# Patient Record
Sex: MALE | Race: WHITE
[De-identification: names, ages, dates, MRNs, and addresses within clinical notes are randomized per-mention and may not be internally consistent; named-entity substitution may affect disease eponyms.]

---

## 2017-05-30 ENCOUNTER — HOSPITAL (OUTPATIENT)
Dept: OTHER | Age: 52
End: 2017-05-30

## 2017-11-11 ENCOUNTER — HOSPITAL (OUTPATIENT)
Dept: OTHER | Age: 52
End: 2017-11-11
Attending: INTERNAL MEDICINE

## 2017-11-11 LAB
A/G RATIO_: 1.1
ABS LYMPH: 1.8 K/CUMM (ref 1–3.5)
ABS MONO: 0.5 K/CUMM (ref 0.1–0.8)
ABS NEUTRO: 3.6 K/CUMM (ref 2–8)
ALBUMIN: 3.9 G/DL (ref 3.5–5)
ALK PHOS: 61 UNIT/L (ref 50–124)
ALT/GPT: 33 UNIT/L (ref 0–55)
ANION GAP SERPL CALC-SCNC: 10 MEQ/L (ref 10–20)
AST/GOT: 18 UNIT/L (ref 5–34)
BASOPHIL: 0 % (ref 0–1)
BILI TOTAL: 0.9 MG/DL (ref 0.2–1)
BUN SERPL-MCNC: 20 MG/DL (ref 6–20)
CALCIUM: 9.6 MG/DL (ref 8.4–10.2)
CHLORIDE: 104 MEQ/L (ref 97–107)
CHOLESTEROL: 209 MG/DL
CREATININE: 1.27 MG/DL (ref 0.6–1.3)
DIFF_TYPE?: NORMAL
EOSINOPHIL: 3 % (ref 0–6)
GLOBULIN_: 3.7 G/DL (ref 2–4.1)
GLUCOSE LVL: 102 MG/DL (ref 70–99)
HCT VFR BLD CALC: 43 % (ref 36–51)
HDLC SERPL-MCNC: 55 MG/DL (ref 40–60)
HEMOLYSIS 2+: NEGATIVE
HEMOLYSIS 4+: NEGATIVE
HGB BLD-MCNC: 13.9 G/DL (ref 12–17)
IMMATURE GRAN: 0.2 % (ref 0–0.3)
INSTR WBC: 6 K/CUMM (ref 4–11)
LDLC SERPL CALC-MCNC: 130 MG/DL
LIPEMIC 3+: NEGATIVE
LYMPHOCYTE: 29 %
MCH RBC QN AUTO: 31 PG (ref 25–35)
MCHC RBC AUTO-ENTMCNC: 32 G/DL (ref 32–37)
MCV RBC AUTO: 96 FL (ref 78–97)
MICROALB/CREAT RATIO: 8 UG/MG
MONOCYTE: 8 %
NEUTROPHIL: 59 %
NRBC BLD MANUAL-RTO: 0 % (ref 0–0.2)
PLATELET: 220 K/CUMM (ref 150–450)
POTASSIUM: 4.4 MEQ/L (ref 3.5–5.1)
PSA LEVEL: 0.47 NG/ML (ref 0–4)
RBC # BLD: 4.45 M/CUMM (ref 4.2–6)
RDW: 12.9 % (ref 11.5–14.5)
SODIUM: 139 MEQ/L (ref 136–145)
TCO2: 29 MEQ/L (ref 19–29)
TOTAL PROTEIN: 7.6 G/DL (ref 6.4–8.3)
TRIGL SERPL-MCNC: 122 MG/DL
TSH SERPL-ACNC: 1.25 MIU/ML (ref 0.4–5)
U CREATININE: 103.79 MG/DL
U MICROALBUMIN: 8 MG/L
UA APPEAR: CLEAR
UA BILI: NEGATIVE
UA BLOOD: NEGATIVE
UA COLOR: YELLOW
UA GLUCOSE: NEGATIVE
UA KETONES: NEGATIVE
UA LEUK EST: NEGATIVE
UA NITRITE: NEGATIVE
UA PH: 6 (ref 5–7)
UA PROTEIN: NEGATIVE
UA SPEC GRAV: 1.01 (ref 1.01–1.02)
UA UROBILINOGEN: 0.2 MG/DL (ref 0.2–1)
WBC # BLD: 6 K/CUMM (ref 4–11)

## 2017-11-20 ENCOUNTER — HOSPITAL (OUTPATIENT)
Dept: OTHER | Age: 52
End: 2017-11-20
Attending: FAMILY MEDICINE

## 2018-02-07 ENCOUNTER — HOSPITAL (OUTPATIENT)
Dept: OTHER | Age: 53
End: 2018-02-07
Attending: FAMILY MEDICINE

## 2018-04-08 ENCOUNTER — HOSPITAL (OUTPATIENT)
Dept: OTHER | Age: 53
End: 2018-04-08
Attending: NURSE PRACTITIONER

## 2018-05-15 ENCOUNTER — HOSPITAL (OUTPATIENT)
Dept: OTHER | Age: 53
End: 2018-05-15

## 2018-05-15 LAB
A/G RATIO_: 1.3
ABS LYMPH: 2.3 K/CUMM (ref 1–3.5)
ABS MONO: 0.6 K/CUMM (ref 0.1–0.8)
ABS NEUTRO: 2.6 K/CUMM (ref 2–8)
ALBUMIN: 4 G/DL (ref 3.5–5)
ALK PHOS: 65 UNIT/L (ref 50–124)
ALT/GPT: 32 UNIT/L (ref 0–55)
ANION GAP SERPL CALC-SCNC: 13 MEQ/L (ref 10–20)
AST/GOT: 16 UNIT/L (ref 5–34)
BASOPHIL: 0 % (ref 0–1)
BILI TOTAL: 0.6 MG/DL (ref 0.2–1)
BUN SERPL-MCNC: 17 MG/DL (ref 6–20)
CALCIUM: 10 MG/DL (ref 8.4–10.2)
CHLORIDE: 104 MEQ/L (ref 97–107)
CREATININE: 1.29 MG/DL (ref 0.6–1.3)
DIFF_TYPE?: NORMAL
EOSINOPHIL: 3 % (ref 0–6)
GLOBULIN_: 3.1 G/DL (ref 2–4.1)
GLUCOSE LVL: 220 MG/DL (ref 70–99)
HCT VFR BLD CALC: 42 % (ref 36–51)
HEMOLYSIS 2+: NEGATIVE
HGB BLD-MCNC: 13.7 G/DL (ref 12–17)
IMMATURE GRAN: 0.2 % (ref 0–0.3)
INSTR WBC: 5.6 K/CUMM (ref 4–11)
LIPEMIC 3+: NEGATIVE
LYMPHOCYTE: 41 %
MCH RBC QN AUTO: 32 PG (ref 25–35)
MCHC RBC AUTO-ENTMCNC: 33 G/DL (ref 32–37)
MCV RBC AUTO: 96 FL (ref 78–97)
MONOCYTE: 10 %
NEUTROPHIL: 46 %
NRBC BLD MANUAL-RTO: 0 % (ref 0–0.2)
PLATELET: 209 K/CUMM (ref 150–450)
POTASSIUM: 4.7 MEQ/L (ref 3.5–5.1)
RBC # BLD: 4.34 M/CUMM (ref 4.2–6)
RDW: 12.7 % (ref 11.5–14.5)
SODIUM: 139 MEQ/L (ref 136–145)
TCO2: 27 MEQ/L (ref 19–29)
TOTAL PROTEIN: 7.1 G/DL (ref 6.4–8.3)
WBC # BLD: 5.6 K/CUMM (ref 4–11)

## 2019-02-06 PROCEDURE — 86480 TB TEST CELL IMMUN MEASURE: CPT | Performed by: PHYSICIAN ASSISTANT

## 2019-10-19 ENCOUNTER — HOSPITAL (OUTPATIENT)
Dept: OTHER | Age: 54
End: 2019-10-19
Attending: EMERGENCY MEDICINE

## 2019-10-19 LAB
A/G RATIO_: 1.1
ABS LYMPH: 2 K/CUMM (ref 1–3.5)
ABS MONO: 0.5 K/CUMM (ref 0.1–0.8)
ABS NEUTRO: 4.3 K/CUMM (ref 2–8)
ALBUMIN: 4 G/DL (ref 3.5–5)
ALK PHOS: 56 UNIT/L (ref 50–124)
ALT/GPT: 53 UNIT/L (ref 0–55)
ANION GAP SERPL CALC-SCNC: 18 MEQ/L (ref 10–20)
AST/GOT: 32 UNIT/L (ref 5–34)
BASOPHIL: 0 % (ref 0–1)
BILI TOTAL: 0.4 MG/DL (ref 0.2–1)
BUN SERPL-MCNC: 24 MG/DL (ref 6–20)
CALCIUM: 9.6 MG/DL (ref 8.4–10.2)
CHLORIDE: 106 MEQ/L (ref 97–107)
CREATININE: 1.59 MG/DL (ref 0.6–1.3)
DIFF_TYPE?: NORMAL
EOSINOPHIL: 3 % (ref 0–6)
GLOBULIN_: 3.5 G/DL (ref 2–4.1)
GLUCOSE LVL: 185 MG/DL (ref 70–99)
HCT VFR BLD CALC: 42 % (ref 36–51)
HEMOLYSIS 2+: ABNORMAL
HEMOLYSIS 4+: NORMAL
HGB BLD-MCNC: 14 G/DL (ref 12–17)
ICTERIC 4+: NEGATIVE
IMMATURE GRAN: 0.6 % (ref 0–0.3)
INSTR WBC: 7.1 K/CUMM (ref 4–11)
LIPEMIC 3+: NEGATIVE
LYMPHOCYTE: 28 %
MCH RBC QN AUTO: 31 PG (ref 25–35)
MCHC RBC AUTO-ENTMCNC: 34 G/DL (ref 32–37)
MCV RBC AUTO: 93 FL (ref 78–97)
MONOCYTE: 8 %
NEUTROPHIL: 61 %
NRBC BLD MANUAL-RTO: 0 % (ref 0–0.2)
PLATELET: 185 K/CUMM (ref 150–450)
POTASSIUM: 4.1 MEQ/L (ref 3.5–5.1)
RBC # BLD: 4.47 M/CUMM (ref 4.2–6)
RDW: 12.4 % (ref 11.5–14.5)
SODIUM: 139 MEQ/L (ref 136–145)
TCO2: 19 MEQ/L (ref 19–29)
TOTAL PROTEIN: 7.5 G/DL (ref 6.4–8.3)
TROPONIN I: 0.01 NG/ML
WBC # BLD: 7.1 K/CUMM (ref 4–11)

## 2019-10-19 PROCEDURE — 93010 ELECTROCARDIOGRAM REPORT: CPT | Performed by: INTERNAL MEDICINE

## 2019-10-20 LAB
HEMOLYSIS 4+: NEGATIVE
ICTERIC 4+: NEGATIVE
TROPONIN I: 0.01 NG/ML

## 2019-10-20 PROCEDURE — 93010 ELECTROCARDIOGRAM REPORT: CPT | Performed by: INTERNAL MEDICINE

## 2020-01-10 PROBLEM — N21.0 BLADDER STONES: Status: ACTIVE | Noted: 2020-01-10

## 2020-01-10 PROBLEM — R33.9 RETENTION, URINE: Status: ACTIVE | Noted: 2020-01-10

## 2020-01-10 PROBLEM — N13.30 BILATERAL HYDRONEPHROSIS: Status: ACTIVE | Noted: 2020-01-10

## 2020-01-10 PROBLEM — N13.8 BPH WITH OBSTRUCTION/LOWER URINARY TRACT SYMPTOMS: Status: ACTIVE | Noted: 2020-01-10

## 2020-01-10 PROBLEM — N40.1 BPH WITH OBSTRUCTION/LOWER URINARY TRACT SYMPTOMS: Status: ACTIVE | Noted: 2020-01-10

## 2020-01-16 PROCEDURE — 88305 TISSUE EXAM BY PATHOLOGIST: CPT | Performed by: UROLOGY

## 2020-01-21 ENCOUNTER — HOSPITAL (OUTPATIENT)
Dept: OTHER | Age: 55
End: 2020-01-21
Attending: EMERGENCY MEDICINE

## 2020-01-21 LAB
A/G RATIO_: 0.9
ABS LYMPH: 0.8 K/CUMM (ref 1–3.5)
ABS MONO: 0.8 K/CUMM (ref 0.1–0.8)
ABS NEUTRO: 10.1 K/CUMM (ref 2–8)
ALBUMIN: 3.8 G/DL (ref 3.5–5)
ALK PHOS: 64 UNIT/L (ref 50–124)
ALT/GPT: 18 UNIT/L (ref 0–55)
ANION GAP SERPL CALC-SCNC: 16 MEQ/L (ref 10–20)
AST/GOT: 12 UNIT/L (ref 5–34)
BASOPHIL: 0 % (ref 0–1)
BILI TOTAL: 1 MG/DL (ref 0.2–1)
BUN SERPL-MCNC: 31 MG/DL (ref 6–20)
CALCIUM: 9.4 MG/DL (ref 8.4–10.2)
CHLORIDE: 94 MEQ/L (ref 97–107)
CREATININE: 1.96 MG/DL (ref 0.6–1.3)
DEVICE SN: ABNORMAL
DIFF_TYPE?: ABNORMAL
EOSINOPHIL: 0 % (ref 0–6)
GLOBULIN_: 4.1 G/DL (ref 2–4.1)
GLUCOSE LVL: 326 MG/DL (ref 70–99)
HCT VFR BLD CALC: 45 % (ref 36–51)
HEMOLYSIS 2+: NEGATIVE
HGB BLD-MCNC: 15 G/DL (ref 12–17)
IMMATURE GRAN: 0.3 % (ref 0–0.3)
INSTR WBC: 11.8 K/CUMM (ref 4–11)
LIPEMIC 3+: NEGATIVE
LYMPHOCYTE: 7 %
MCH RBC QN AUTO: 31 PG (ref 25–35)
MCHC RBC AUTO-ENTMCNC: 34 G/DL (ref 32–37)
MCV RBC AUTO: 92 FL (ref 78–97)
MONOCYTE: 7 %
NEUTROPHIL: 85 %
NRBC BLD MANUAL-RTO: 0 % (ref 0–0.2)
PLATELET: 254 K/CUMM (ref 150–450)
POC_GLU: 301 MG/DL (ref 70–99)
POTASSIUM: 5.3 MEQ/L (ref 3.5–5.1)
RBC # BLD: 4.85 M/CUMM (ref 4.2–6)
RDW: 12.5 % (ref 11.5–14.5)
SODIUM: 125 MEQ/L (ref 136–145)
TCO2: 20 MEQ/L (ref 19–29)
TECH_ID: ABNORMAL
TOTAL PROTEIN: 7.9 G/DL (ref 6.4–8.3)
UA APPEAR: CLEAR
UA BACTERIA: ABNORMAL
UA BILI: NEGATIVE
UA BLOOD: ABNORMAL
UA COLOR: YELLOW
UA EPITHELIAL: ABNORMAL
UA GLUCOSE: ABNORMAL
UA KETONES: NEGATIVE
UA LEUK EST: ABNORMAL
UA NITRITE: NEGATIVE
UA PH: 6 (ref 5–7)
UA PROTEIN: ABNORMAL
UA RBC: ABNORMAL
UA SPEC GRAV: 1.02 (ref 1.01–1.02)
UA UROBILINOGEN: 0.2 MG/DL (ref 0.2–1)
UA WBC: ABNORMAL
WBC # BLD: 11.8 K/CUMM (ref 4–11)

## 2020-02-07 PROCEDURE — 99252 IP/OBS CONSLTJ NEW/EST SF 35: CPT | Performed by: NURSE PRACTITIONER

## 2020-05-14 PROBLEM — R33.9 INCOMPLETE BLADDER EMPTYING: Status: ACTIVE | Noted: 2020-05-14

## 2024-06-05 ENCOUNTER — ANESTHESIA EVENT (OUTPATIENT)
Dept: SURGERY | Age: 59
End: 2024-06-05

## 2024-06-05 RX ORDER — DULAGLUTIDE 3 MG/.5ML
0.5 INJECTION, SOLUTION SUBCUTANEOUS
COMMUNITY
Start: 2024-03-03 | End: 2024-08-30

## 2024-06-05 RX ORDER — AMLODIPINE BESYLATE 10 MG/1
5 TABLET ORAL DAILY
COMMUNITY
Start: 2023-12-20

## 2024-06-05 RX ORDER — ATORVASTATIN CALCIUM 40 MG/1
1 TABLET, FILM COATED ORAL NIGHTLY
COMMUNITY
Start: 2024-03-07

## 2024-06-05 RX ORDER — PIOGLITAZONEHYDROCHLORIDE 30 MG/1
30 TABLET ORAL NIGHTLY
COMMUNITY

## 2024-06-05 RX ORDER — CARVEDILOL 12.5 MG/1
12.5 TABLET ORAL 2 TIMES DAILY
COMMUNITY
Start: 2024-03-13

## 2024-06-05 RX ORDER — OMEPRAZOLE 40 MG/1
40 CAPSULE, DELAYED RELEASE ORAL DAILY
COMMUNITY
Start: 2024-04-17

## 2024-06-05 ASSESSMENT — ACTIVITIES OF DAILY LIVING (ADL)
ADL_BEFORE_ADMISSION: INDEPENDENT
SENSORY_SUPPORT_DEVICES: EYEGLASSES
ADL_SCORE: 12

## 2024-06-06 ENCOUNTER — ANESTHESIA (OUTPATIENT)
Dept: SURGERY | Age: 59
End: 2024-06-06

## 2024-06-06 ENCOUNTER — HOSPITAL ENCOUNTER (OUTPATIENT)
Age: 59
Discharge: HOME | End: 2024-06-06
Attending: UROLOGY | Admitting: UROLOGY

## 2024-06-06 ENCOUNTER — APPOINTMENT (OUTPATIENT)
Dept: GENERAL RADIOLOGY | Age: 59
End: 2024-06-06
Attending: UROLOGY

## 2024-06-06 DIAGNOSIS — N13.5 URETERAL STRICTURE, LEFT: Primary | ICD-10-CM

## 2024-06-06 LAB
GLUCOSE BLDC GLUCOMTR-MCNC: 126 MG/DL (ref 70–99)
GLUCOSE BLDC GLUCOMTR-MCNC: 133 MG/DL (ref 70–99)

## 2024-06-06 PROCEDURE — 10002807 HB RX 258: Performed by: ANESTHESIOLOGY

## 2024-06-06 PROCEDURE — 10005281 FL INTRAOPERATIVE C ARM WITH REPORT

## 2024-06-06 PROCEDURE — 82962 GLUCOSE BLOOD TEST: CPT

## 2024-06-06 PROCEDURE — 13000003 HB ANESTHESIA  GENERAL EA ADD MINUTE: Performed by: UROLOGY

## 2024-06-06 PROCEDURE — 10002805 HB CONTRAST AGENT: Performed by: UROLOGY

## 2024-06-06 PROCEDURE — 10002800 HB RX 250 W HCPCS: Performed by: UROLOGY

## 2024-06-06 PROCEDURE — 10006027 HB SUPPLY 278: Performed by: UROLOGY

## 2024-06-06 PROCEDURE — 13000037 HB COMPLEX CASE EACH ADD MINUTE: Performed by: UROLOGY

## 2024-06-06 PROCEDURE — 10002800 HB RX 250 W HCPCS

## 2024-06-06 PROCEDURE — 13000001 HB PHASE II RECOVERY EA 30 MINUTES: Performed by: UROLOGY

## 2024-06-06 PROCEDURE — 10004452 HB PACU ADDL 30 MINUTES: Performed by: UROLOGY

## 2024-06-06 PROCEDURE — C1726 CATH, BAL DIL, NON-VASCULAR: HCPCS | Performed by: UROLOGY

## 2024-06-06 PROCEDURE — C2617 STENT, NON-COR, TEM W/O DEL: HCPCS | Performed by: UROLOGY

## 2024-06-06 PROCEDURE — 10004451 HB PACU RECOVERY 1ST 30 MINUTES: Performed by: UROLOGY

## 2024-06-06 PROCEDURE — C1769 GUIDE WIRE: HCPCS | Performed by: UROLOGY

## 2024-06-06 PROCEDURE — 13000002 HB ANESTHESIA  GENERAL  S/U + 1ST 15 MIN: Performed by: UROLOGY

## 2024-06-06 PROCEDURE — 13000036 HB COMPLEX  CASE S/U + 1ST 15 MIN: Performed by: UROLOGY

## 2024-06-06 PROCEDURE — 10004651 HB RX, NO CHARGE ITEM

## 2024-06-06 PROCEDURE — 10006023 HB SUPPLY 272: Performed by: UROLOGY

## 2024-06-06 PROCEDURE — C1758 CATHETER, URETERAL: HCPCS | Performed by: UROLOGY

## 2024-06-06 PROCEDURE — 10002801 HB RX 250 W/O HCPCS

## 2024-06-06 DEVICE — URETERAL STENT
Type: IMPLANTABLE DEVICE | Site: URETER | Status: FUNCTIONAL
Brand: CONTOUR™

## 2024-06-06 RX ORDER — MIDAZOLAM HYDROCHLORIDE 1 MG/ML
INJECTION, SOLUTION INTRAMUSCULAR; INTRAVENOUS PRN
Status: DISCONTINUED | OUTPATIENT
Start: 2024-06-06 | End: 2024-06-06

## 2024-06-06 RX ORDER — SODIUM CHLORIDE, SODIUM LACTATE, POTASSIUM CHLORIDE, CALCIUM CHLORIDE 600; 310; 30; 20 MG/100ML; MG/100ML; MG/100ML; MG/100ML
INJECTION, SOLUTION INTRAVENOUS CONTINUOUS
Status: DISCONTINUED | OUTPATIENT
Start: 2024-06-06 | End: 2024-06-06 | Stop reason: HOSPADM

## 2024-06-06 RX ORDER — 0.9 % SODIUM CHLORIDE 0.9 %
2 VIAL (ML) INJECTION EVERY 12 HOURS SCHEDULED
Status: DISCONTINUED | OUTPATIENT
Start: 2024-06-06 | End: 2024-06-06 | Stop reason: HOSPADM

## 2024-06-06 RX ORDER — PROPOFOL 10 MG/ML
INJECTION, EMULSION INTRAVENOUS PRN
Status: DISCONTINUED | OUTPATIENT
Start: 2024-06-06 | End: 2024-06-06

## 2024-06-06 RX ORDER — NICOTINE POLACRILEX 4 MG
30 LOZENGE BUCCAL
Status: DISCONTINUED | OUTPATIENT
Start: 2024-06-06 | End: 2024-06-06 | Stop reason: HOSPADM

## 2024-06-06 RX ORDER — OXYCODONE HYDROCHLORIDE 5 MG/1
5 TABLET ORAL
Status: DISCONTINUED | OUTPATIENT
Start: 2024-06-06 | End: 2024-06-06 | Stop reason: HOSPADM

## 2024-06-06 RX ORDER — DEXAMETHASONE SODIUM PHOSPHATE 4 MG/ML
INJECTION, SOLUTION INTRA-ARTICULAR; INTRALESIONAL; INTRAMUSCULAR; INTRAVENOUS; SOFT TISSUE PRN
Status: DISCONTINUED | OUTPATIENT
Start: 2024-06-06 | End: 2024-06-06

## 2024-06-06 RX ORDER — ACETAMINOPHEN 325 MG/1
650 TABLET ORAL
Status: DISCONTINUED | OUTPATIENT
Start: 2024-06-06 | End: 2024-06-06 | Stop reason: HOSPADM

## 2024-06-06 RX ORDER — ONDANSETRON 2 MG/ML
INJECTION INTRAMUSCULAR; INTRAVENOUS PRN
Status: DISCONTINUED | OUTPATIENT
Start: 2024-06-06 | End: 2024-06-06

## 2024-06-06 RX ORDER — DEXTROSE MONOHYDRATE 25 G/50ML
25 INJECTION, SOLUTION INTRAVENOUS PRN
Status: DISCONTINUED | OUTPATIENT
Start: 2024-06-06 | End: 2024-06-06 | Stop reason: HOSPADM

## 2024-06-06 RX ORDER — PHENAZOPYRIDINE HYDROCHLORIDE 95 MG/1
95 TABLET ORAL 3 TIMES DAILY
Qty: 9 TABLET | Refills: 0 | Status: SHIPPED | OUTPATIENT
Start: 2024-06-06

## 2024-06-06 RX ORDER — SULFAMETHOXAZOLE AND TRIMETHOPRIM 800; 160 MG/1; MG/1
1 TABLET ORAL 2 TIMES DAILY
Qty: 14 TABLET | Refills: 0 | Status: SHIPPED | OUTPATIENT
Start: 2024-06-06 | End: 2024-06-13

## 2024-06-06 RX ORDER — DROPERIDOL 2.5 MG/ML
0.62 INJECTION, SOLUTION INTRAMUSCULAR; INTRAVENOUS
Status: DISCONTINUED | OUTPATIENT
Start: 2024-06-06 | End: 2024-06-06 | Stop reason: HOSPADM

## 2024-06-06 RX ORDER — ONDANSETRON 2 MG/ML
4 INJECTION INTRAMUSCULAR; INTRAVENOUS
Status: DISCONTINUED | OUTPATIENT
Start: 2024-06-06 | End: 2024-06-06 | Stop reason: HOSPADM

## 2024-06-06 RX ORDER — LIDOCAINE HYDROCHLORIDE 20 MG/ML
INJECTION, SOLUTION INFILTRATION; PERINEURAL PRN
Status: DISCONTINUED | OUTPATIENT
Start: 2024-06-06 | End: 2024-06-06

## 2024-06-06 RX ORDER — OXYBUTYNIN CHLORIDE 5 MG/1
5 TABLET, EXTENDED RELEASE ORAL EVERY 6 HOURS PRN
Qty: 30 TABLET | Refills: 1 | Status: SHIPPED | OUTPATIENT
Start: 2024-06-06

## 2024-06-06 RX ORDER — HYDRALAZINE HYDROCHLORIDE 20 MG/ML
5 INJECTION INTRAMUSCULAR; INTRAVENOUS EVERY 10 MIN PRN
Status: DISCONTINUED | OUTPATIENT
Start: 2024-06-06 | End: 2024-06-06 | Stop reason: HOSPADM

## 2024-06-06 RX ADMIN — LIDOCAINE HYDROCHLORIDE 4 ML: 20 INJECTION, SOLUTION INFILTRATION; PERINEURAL at 07:43

## 2024-06-06 RX ADMIN — SODIUM CHLORIDE, POTASSIUM CHLORIDE, SODIUM LACTATE AND CALCIUM CHLORIDE: 600; 310; 30; 20 INJECTION, SOLUTION INTRAVENOUS at 06:45

## 2024-06-06 RX ADMIN — MIDAZOLAM HYDROCHLORIDE 2 MG: 1 INJECTION, SOLUTION INTRAMUSCULAR; INTRAVENOUS at 07:36

## 2024-06-06 RX ADMIN — FENTANYL CITRATE 50 MCG: 50 INJECTION INTRAMUSCULAR; INTRAVENOUS at 08:48

## 2024-06-06 RX ADMIN — DEXAMETHASONE SODIUM PHOSPHATE 4 MG: 4 INJECTION INTRA-ARTICULAR; INTRALESIONAL; INTRAMUSCULAR; INTRAVENOUS; SOFT TISSUE at 08:03

## 2024-06-06 RX ADMIN — CEFAZOLIN SODIUM 2000 MG: 300 INJECTION, POWDER, LYOPHILIZED, FOR SOLUTION INTRAVENOUS at 07:43

## 2024-06-06 RX ADMIN — PROPOFOL 160 MG: 10 INJECTION, EMULSION INTRAVENOUS at 07:43

## 2024-06-06 RX ADMIN — FENTANYL CITRATE 50 MCG: 50 INJECTION INTRAMUSCULAR; INTRAVENOUS at 07:43

## 2024-06-06 RX ADMIN — ACETAMINOPHEN 650 MG: 325 TABLET ORAL at 11:05

## 2024-06-06 RX ADMIN — FENTANYL CITRATE 50 MCG: 50 INJECTION INTRAMUSCULAR; INTRAVENOUS at 09:11

## 2024-06-06 RX ADMIN — ONDANSETRON 4 MG: 2 INJECTION INTRAMUSCULAR; INTRAVENOUS at 08:11

## 2024-06-06 RX ADMIN — FENTANYL CITRATE 50 MCG: 50 INJECTION INTRAMUSCULAR; INTRAVENOUS at 08:01

## 2024-06-06 ASSESSMENT — PAIN SCALES - GENERAL
PAINLEVEL_OUTOF10: 0
PAINLEVEL_OUTOF10: 4
PAINLEVEL_OUTOF10: 0
PAINLEVEL_OUTOF10: 0
PAINLEVEL_OUTOF10: 7
PAINLEVEL_OUTOF10: 0
PAINLEVEL_OUTOF10: 7

## 2024-06-07 VITALS
HEIGHT: 69 IN | WEIGHT: 224.65 LBS | RESPIRATION RATE: 16 BRPM | HEART RATE: 70 BPM | DIASTOLIC BLOOD PRESSURE: 76 MMHG | TEMPERATURE: 96.7 F | OXYGEN SATURATION: 93 % | BODY MASS INDEX: 33.27 KG/M2 | SYSTOLIC BLOOD PRESSURE: 124 MMHG

## 2025-02-14 RX ORDER — ACITRETIN 10 MG/1
10 CAPSULE ORAL
COMMUNITY

## 2025-02-14 RX ORDER — CARVEDILOL 12.5 MG/1
12.5 TABLET ORAL 2 TIMES DAILY WITH MEALS
COMMUNITY
Start: 2024-03-13

## 2025-02-14 RX ORDER — OMEPRAZOLE 40 MG/1
40 CAPSULE, DELAYED RELEASE ORAL
COMMUNITY
Start: 2024-04-17

## 2025-02-14 RX ORDER — DULAGLUTIDE 3 MG/.5ML
1 INJECTION, SOLUTION SUBCUTANEOUS WEEKLY
COMMUNITY
Start: 2025-01-09

## 2025-02-18 NOTE — DISCHARGE INSTRUCTIONS
HOME INSTRUCTIONS  [facility] HOME CARE INSTRUCTIONS: POST-OP ANESTHESIA  The medication that you received for sedation or general anesthesia can last up to 24 hours. Your judgment and reflexes may be altered, even if you feel like your normal self.      We Recommend:   Do not drive any motor vehicle or bicycle   Avoid mowing the lawn, playing sports, or working with power tools/applicances (power saws, electric knives or mixers)   That you have someone stay with you on your first night home   Do not drink alcohol or take sleeping pills or tranquilizers   Do not sign legal documents within 24 hours of your procedure   If you had a nerve block for your surgery, take extra care not to put any pressure on your arm or hand for 24 hours    It is normal:  For you to have a sore throat if you had a breathing tube during surgery (while you were asleep!). The sore throat should get better within 48 hours. You can gargle with warm salt water (1/2 tsp in 4 oz warm water) or use a throat lozenge for comfort  To feel muscle aches or soreness especially in the abdomen, chest or neck. The achy feeling should go away in the next 24 hours  To feel weak, sleepy or \"wiped out\". Your should start feeling better in the next 24 hours.   To experience mild discomforts such as sore lip or tongue, headache, cramps, gas pains or a bloated feeling in your abdomen.   To experience mild back pain or soreness for a day or two if you had spinal or epidural anesthesia.   If you had laparoscopic surgery, to feel shoulder pain or discomfort on the day of surgery.   For some patients to have nausea after surgery/anesthesia    If you feel nausea or experience vomiting:   Try to move around less.   Eat less than usual or drink only liquids until the next morning   Nausea should resolve in about 24 hours    If you have a problem when you are at home:    Call your surgeons office   Discharge Instructions: After Your Surgery  You’ve just had surgery. During  surgery, you were given medicine called anesthesia to keep you relaxed and free of pain. After surgery, you may have some pain or nausea. This is common. Here are some tips for feeling better and getting well after surgery.   Going home  Your healthcare provider will show you how to take care of yourself when you go home. They'll also answer your questions. Have an adult family member or friend drive you home. For the first 24 hours after your surgery:   Don't drive or use heavy equipment.  Don't make important decisions or sign legal papers.  Take medicines as directed.  Don't drink alcohol.  Have someone stay with you, if needed. They can watch for problems and help keep you safe.  Be sure to go to all follow-up visits with your healthcare provider. And rest after your surgery for as long as your provider tells you to.   Coping with pain  If you have pain after surgery, pain medicine will help you feel better. Take it as directed, before pain becomes severe. Also, ask your healthcare provider or pharmacist about other ways to control pain. This might be with heat, ice, or relaxation. And follow any other instructions your surgeon or nurse gives you.      Stay on schedule with your medicine.     Tips for taking pain medicine  To get the best relief possible, remember these points:   Pain medicines can upset your stomach. Taking them with a little food may help.  Most pain relievers taken by mouth need at least 20 to 30 minutes to start to work.  Don't wait till your pain becomes severe before you take your medicine. Try to time your medicine so that you can take it before starting an activity. This might be before you get dressed, go for a walk, or sit down for dinner.  Constipation is a common side effect of some pain medicines. Call your healthcare provider before taking any medicines such as laxatives or stool softeners to help ease constipation. Also ask if you should skip any foods. Drinking lots of fluids and  eating foods such as fruits and vegetables that are high in fiber can also help. Remember, don't take laxatives unless your surgeon has prescribed them.  Drinking alcohol and taking pain medicine can cause dizziness and slow your breathing. It can even be deadly. Don't drink alcohol while taking pain medicine.  Pain medicine can make you react more slowly to things. Don't drive or run machinery while taking pain medicine.  Your healthcare provider may tell you to take acetaminophen to help ease your pain. Ask them how much you're supposed to take each day. Acetaminophen or other pain relievers may interact with your prescription medicines or other over-the-counter (OTC) medicines. Some prescription medicines have acetaminophen and other ingredients in them. Using both prescription and OTC acetaminophen for pain can cause you to accidentally overdose. Read the labels on your OTC medicines with care. This will help you to clearly know the list of ingredients, how much to take, and any warnings. It may also help you not take too much acetaminophen. If you have questions or don't understand the information, ask your pharmacist or healthcare provider to explain it to you before you take the OTC medicine.   Managing nausea  Some people have an upset stomach (nausea) after surgery. This is often because of anesthesia, pain, or pain medicine, less movement of food in the stomach, or the stress of surgery. These tips will help you handle nausea and eat healthy foods as you get better. If you were on a special food plan before surgery, ask your healthcare provider if you should follow it while you get better. Check with your provider on how your eating should progress. It may depend on the surgery you had. These general tips may help:   Don't push yourself to eat. Your body will tell you when to eat and how much.  Start off with clear liquids and soup. They're easier to digest.  Next try semi-solid foods as you feel ready.  These include mashed potatoes, applesauce, and gelatin.  Slowly move to solid foods. Don’t eat fatty, rich, or spicy foods at first.  Don't force yourself to have 3 large meals a day. Instead eat smaller amounts more often.  Take pain medicines with a small amount of solid food, such as crackers or toast. This helps prevent nausea.  When to call your healthcare provider  Call your healthcare provider right away if you have any of these:   You still have too much pain, or the pain gets worse, after taking the medicine. The medicine may not be strong enough. Or there may be a complication from the surgery.  You feel too sleepy, dizzy, or groggy. The medicine may be too strong.  Side effects such as nausea or vomiting. Your healthcare provider may advise taking other medicines to .  Skin changes such as rash, itching, or hives. This may mean you have an allergic reaction. Your provider may advise taking other medicines.  The incision looks different (for instance, part of it opens up).  Bleeding or fluid leaking from the incision site, and weren't told to expect that.  Fever of 100.4°F (38°C) or higher, or as directed by your provider.  Call 911  Call 911 right away if you have:   Trouble breathing  Facial swelling    If you have obstructive sleep apnea   You were given anesthesia medicine during surgery to keep you comfortable and free of pain. After surgery, you may have more apnea spells because of this medicine and other medicines you were given. The spells may last longer than normal.    At home:  Keep using the continuous positive airway pressure (CPAP) device when you sleep. Unless your healthcare provider tells you not to, use it when you sleep, day or night. CPAP is a common device used to treat obstructive sleep apnea.  Talk with your provider before taking any pain medicine, muscle relaxants, or sedatives. Your provider will tell you about the possible dangers of taking these medicines.  Contact your  provider if your sleeping changes a lot even when taking medicines as directed.  David last reviewed this educational content on 10/1/2021  © 3429-4566 [employer]. All rights reserved. This information is not intended as a substitute for professional medical care. Always follow your healthcare professional's instructions.      POST URETHROTOMY  INSTRUCTIONS  Leave the compressive dressing in place for 2 days .(  Friday)    Unravel the dressing in 2 days  and then you can take a shower ( Friday)   Place neosporin  ointment 3 x a day to the catheter and tip of the penis and incision line after you remove the compressive dressing   Bactrim antibiotics twice a day  when home x 1 week   Hydrocodone / Norco  for pain   Force fluids -- drink  6-8  glasses of water a day  till the urine is all clear     Remove the lorenzo at home in 5 days -- ( the nurses will instruct you how to remove and give you a 10 Ml syringe to deflate the balloon .)      Use  the large night  bag when in house. Use the hook on the bag and place it in your pocket or hook to your belt.   Its OK to lay the night bag on the floor while you sleep.   Exchange the night bag to the smaller leg bag when you go out of the house.     NO strenuous activity,  heavy lifting ( > 10-15 lbs) or exercise for 10-14 days  However, don't be a prisoner of your house. Its a good idea to go for a walk and be out  of the house every day.        Follow up  in  the office in    4 to 6  weeks to remove the ureteral stent

## 2025-02-19 ENCOUNTER — HOSPITAL ENCOUNTER (OUTPATIENT)
Facility: HOSPITAL | Age: 60
Setting detail: HOSPITAL OUTPATIENT SURGERY
Discharge: HOME | End: 2025-02-19
Attending: UROLOGY
Payer: COMMERCIAL

## 2025-02-19 ENCOUNTER — ANESTHESIA EVENT (OUTPATIENT)
Dept: SURGERY | Facility: HOSPITAL | Age: 60
End: 2025-02-19
Payer: COMMERCIAL

## 2025-02-19 ENCOUNTER — APPOINTMENT (OUTPATIENT)
Dept: GENERAL RADIOLOGY | Facility: HOSPITAL | Age: 60
End: 2025-02-19
Attending: UROLOGY
Payer: COMMERCIAL

## 2025-02-19 ENCOUNTER — ANESTHESIA (OUTPATIENT)
Dept: SURGERY | Facility: HOSPITAL | Age: 60
End: 2025-02-19
Payer: COMMERCIAL

## 2025-02-19 VITALS
DIASTOLIC BLOOD PRESSURE: 93 MMHG | RESPIRATION RATE: 15 BRPM | WEIGHT: 227 LBS | TEMPERATURE: 97 F | HEIGHT: 69.5 IN | HEART RATE: 67 BPM | BODY MASS INDEX: 32.87 KG/M2 | SYSTOLIC BLOOD PRESSURE: 145 MMHG | OXYGEN SATURATION: 95 %

## 2025-02-19 DIAGNOSIS — N13.30 BILATERAL HYDRONEPHROSIS: ICD-10-CM

## 2025-02-19 DIAGNOSIS — R33.9 INCOMPLETE BLADDER EMPTYING: Primary | ICD-10-CM

## 2025-02-19 LAB
GLUCOSE BLDC GLUCOMTR-MCNC: 100 MG/DL (ref 70–99)
GLUCOSE BLDC GLUCOMTR-MCNC: 94 MG/DL (ref 70–99)

## 2025-02-19 PROCEDURE — 0TND8ZZ RELEASE URETHRA, VIA NATURAL OR ARTIFICIAL OPENING ENDOSCOPIC: ICD-10-PCS | Performed by: UROLOGY

## 2025-02-19 PROCEDURE — 82962 GLUCOSE BLOOD TEST: CPT

## 2025-02-19 PROCEDURE — 3E0K8GC INTRODUCTION OF OTHER THERAPEUTIC SUBSTANCE INTO GENITOURINARY TRACT, VIA NATURAL OR ARTIFICIAL OPENING ENDOSCOPIC: ICD-10-PCS | Performed by: UROLOGY

## 2025-02-19 PROCEDURE — 0T768DZ DILATION OF RIGHT URETER WITH INTRALUMINAL DEVICE, VIA NATURAL OR ARTIFICIAL OPENING ENDOSCOPIC: ICD-10-PCS | Performed by: UROLOGY

## 2025-02-19 DEVICE — URETERAL STENT
Type: IMPLANTABLE DEVICE | Site: URETER | Status: FUNCTIONAL
Brand: CONTOUR™

## 2025-02-19 RX ORDER — NICOTINE POLACRILEX 4 MG
30 LOZENGE BUCCAL
Status: DISCONTINUED | OUTPATIENT
Start: 2025-02-19 | End: 2025-02-27

## 2025-02-19 RX ORDER — SODIUM CHLORIDE, SODIUM LACTATE, POTASSIUM CHLORIDE, CALCIUM CHLORIDE 600; 310; 30; 20 MG/100ML; MG/100ML; MG/100ML; MG/100ML
INJECTION, SOLUTION INTRAVENOUS CONTINUOUS
Status: DISCONTINUED | OUTPATIENT
Start: 2025-02-19 | End: 2025-02-27

## 2025-02-19 RX ORDER — HYDROMORPHONE HYDROCHLORIDE 1 MG/ML
0.4 INJECTION, SOLUTION INTRAMUSCULAR; INTRAVENOUS; SUBCUTANEOUS EVERY 5 MIN PRN
Status: DISCONTINUED | OUTPATIENT
Start: 2025-02-19 | End: 2025-02-27

## 2025-02-19 RX ORDER — FUROSEMIDE 10 MG/ML
20 INJECTION INTRAMUSCULAR; INTRAVENOUS ONCE
Status: COMPLETED | OUTPATIENT
Start: 2025-02-19 | End: 2025-02-19

## 2025-02-19 RX ORDER — FAMOTIDINE 10 MG/ML
20 INJECTION, SOLUTION INTRAVENOUS ONCE
Status: COMPLETED | OUTPATIENT
Start: 2025-02-19 | End: 2025-02-19

## 2025-02-19 RX ORDER — METOCLOPRAMIDE 10 MG/1
10 TABLET ORAL ONCE
Status: COMPLETED | OUTPATIENT
Start: 2025-02-19 | End: 2025-02-19

## 2025-02-19 RX ORDER — NALOXONE HYDROCHLORIDE 0.4 MG/ML
0.08 INJECTION, SOLUTION INTRAMUSCULAR; INTRAVENOUS; SUBCUTANEOUS AS NEEDED
Status: DISCONTINUED | OUTPATIENT
Start: 2025-02-19 | End: 2025-02-19

## 2025-02-19 RX ORDER — METOPROLOL TARTRATE 25 MG/1
25 TABLET, FILM COATED ORAL ONCE AS NEEDED
Status: DISCONTINUED | OUTPATIENT
Start: 2025-02-19 | End: 2025-02-19 | Stop reason: HOSPADM

## 2025-02-19 RX ORDER — ATROPINE SULFATE 1 MG/ML
INJECTION, SOLUTION INTRAMUSCULAR; INTRAVENOUS; SUBCUTANEOUS AS NEEDED
Status: DISCONTINUED | OUTPATIENT
Start: 2025-02-19 | End: 2025-02-19 | Stop reason: SURG

## 2025-02-19 RX ORDER — HYDROMORPHONE HYDROCHLORIDE 1 MG/ML
0.6 INJECTION, SOLUTION INTRAMUSCULAR; INTRAVENOUS; SUBCUTANEOUS EVERY 5 MIN PRN
Status: DISCONTINUED | OUTPATIENT
Start: 2025-02-19 | End: 2025-02-27

## 2025-02-19 RX ORDER — HYDROCODONE BITARTRATE AND ACETAMINOPHEN 5; 325 MG/1; MG/1
1-2 TABLET ORAL EVERY 4 HOURS PRN
Qty: 30 TABLET | Refills: 0 | Status: SHIPPED | OUTPATIENT
Start: 2025-02-19

## 2025-02-19 RX ORDER — DEXTROSE MONOHYDRATE 25 G/50ML
50 INJECTION, SOLUTION INTRAVENOUS
Status: DISCONTINUED | OUTPATIENT
Start: 2025-02-19 | End: 2025-02-27

## 2025-02-19 RX ORDER — ONDANSETRON 2 MG/ML
4 INJECTION INTRAMUSCULAR; INTRAVENOUS EVERY 6 HOURS PRN
Status: DISCONTINUED | OUTPATIENT
Start: 2025-02-19 | End: 2025-02-27

## 2025-02-19 RX ORDER — METOCLOPRAMIDE HYDROCHLORIDE 5 MG/ML
10 INJECTION INTRAMUSCULAR; INTRAVENOUS ONCE
Status: COMPLETED | OUTPATIENT
Start: 2025-02-19 | End: 2025-02-19

## 2025-02-19 RX ORDER — ACETAMINOPHEN 500 MG
1000 TABLET ORAL ONCE
Status: COMPLETED | OUTPATIENT
Start: 2025-02-19 | End: 2025-02-19

## 2025-02-19 RX ORDER — PROCHLORPERAZINE EDISYLATE 5 MG/ML
5 INJECTION INTRAMUSCULAR; INTRAVENOUS EVERY 8 HOURS PRN
Status: DISCONTINUED | OUTPATIENT
Start: 2025-02-19 | End: 2025-02-27

## 2025-02-19 RX ORDER — IOPAMIDOL 612 MG/ML
INJECTION, SOLUTION INTRAVASCULAR AS NEEDED
Status: DISCONTINUED | OUTPATIENT
Start: 2025-02-19 | End: 2025-02-19 | Stop reason: HOSPADM

## 2025-02-19 RX ORDER — LIDOCAINE HYDROCHLORIDE 10 MG/ML
INJECTION, SOLUTION EPIDURAL; INFILTRATION; INTRACAUDAL; PERINEURAL AS NEEDED
Status: DISCONTINUED | OUTPATIENT
Start: 2025-02-19 | End: 2025-02-19 | Stop reason: SURG

## 2025-02-19 RX ORDER — HYDROMORPHONE HYDROCHLORIDE 1 MG/ML
0.2 INJECTION, SOLUTION INTRAMUSCULAR; INTRAVENOUS; SUBCUTANEOUS EVERY 5 MIN PRN
Status: DISCONTINUED | OUTPATIENT
Start: 2025-02-19 | End: 2025-02-27

## 2025-02-19 RX ORDER — MIDAZOLAM HYDROCHLORIDE 1 MG/ML
INJECTION INTRAMUSCULAR; INTRAVENOUS AS NEEDED
Status: DISCONTINUED | OUTPATIENT
Start: 2025-02-19 | End: 2025-02-19 | Stop reason: SURG

## 2025-02-19 RX ORDER — ROCURONIUM BROMIDE 10 MG/ML
INJECTION, SOLUTION INTRAVENOUS AS NEEDED
Status: DISCONTINUED | OUTPATIENT
Start: 2025-02-19 | End: 2025-02-19 | Stop reason: SURG

## 2025-02-19 RX ORDER — DEXAMETHASONE SODIUM PHOSPHATE 4 MG/ML
VIAL (ML) INJECTION AS NEEDED
Status: DISCONTINUED | OUTPATIENT
Start: 2025-02-19 | End: 2025-02-19 | Stop reason: SURG

## 2025-02-19 RX ORDER — ONDANSETRON 2 MG/ML
INJECTION INTRAMUSCULAR; INTRAVENOUS AS NEEDED
Status: DISCONTINUED | OUTPATIENT
Start: 2025-02-19 | End: 2025-02-19 | Stop reason: SURG

## 2025-02-19 RX ORDER — FAMOTIDINE 20 MG/1
20 TABLET, FILM COATED ORAL ONCE
Status: COMPLETED | OUTPATIENT
Start: 2025-02-19 | End: 2025-02-19

## 2025-02-19 RX ORDER — SULFAMETHOXAZOLE AND TRIMETHOPRIM 800; 160 MG/1; MG/1
1 TABLET ORAL 2 TIMES DAILY
Qty: 14 TABLET | Refills: 0 | Status: SHIPPED | OUTPATIENT
Start: 2025-02-19 | End: 2025-02-26

## 2025-02-19 RX ORDER — NICOTINE POLACRILEX 4 MG
15 LOZENGE BUCCAL
Status: DISCONTINUED | OUTPATIENT
Start: 2025-02-19 | End: 2025-02-27

## 2025-02-19 RX ORDER — EPHEDRINE SULFATE 50 MG/ML
INJECTION, SOLUTION INTRAVENOUS AS NEEDED
Status: DISCONTINUED | OUTPATIENT
Start: 2025-02-19 | End: 2025-02-19 | Stop reason: SURG

## 2025-02-19 RX ADMIN — EPHEDRINE SULFATE 10 MG: 50 INJECTION, SOLUTION INTRAVENOUS at 13:56:00

## 2025-02-19 RX ADMIN — MIDAZOLAM HYDROCHLORIDE 2 MG: 1 INJECTION INTRAMUSCULAR; INTRAVENOUS at 12:54:00

## 2025-02-19 RX ADMIN — ONDANSETRON 4 MG: 2 INJECTION INTRAMUSCULAR; INTRAVENOUS at 13:25:00

## 2025-02-19 RX ADMIN — DEXAMETHASONE SODIUM PHOSPHATE 4 MG: 4 MG/ML VIAL (ML) INJECTION at 13:25:00

## 2025-02-19 RX ADMIN — EPHEDRINE SULFATE 5 MG: 50 INJECTION, SOLUTION INTRAVENOUS at 14:08:00

## 2025-02-19 RX ADMIN — EPHEDRINE SULFATE 5 MG: 50 INJECTION, SOLUTION INTRAVENOUS at 14:04:00

## 2025-02-19 RX ADMIN — SODIUM CHLORIDE, SODIUM LACTATE, POTASSIUM CHLORIDE, CALCIUM CHLORIDE: 600; 310; 30; 20 INJECTION, SOLUTION INTRAVENOUS at 14:35:00

## 2025-02-19 RX ADMIN — ROCURONIUM BROMIDE 10 MG: 10 INJECTION, SOLUTION INTRAVENOUS at 13:49:00

## 2025-02-19 RX ADMIN — LIDOCAINE HYDROCHLORIDE 50 MG: 10 INJECTION, SOLUTION EPIDURAL; INFILTRATION; INTRACAUDAL; PERINEURAL at 12:57:00

## 2025-02-19 RX ADMIN — ROCURONIUM BROMIDE 30 MG: 10 INJECTION, SOLUTION INTRAVENOUS at 12:57:00

## 2025-02-19 RX ADMIN — ATROPINE SULFATE 0.5 MG: 1 INJECTION, SOLUTION INTRAMUSCULAR; INTRAVENOUS; SUBCUTANEOUS at 14:19:00

## 2025-02-19 NOTE — ANESTHESIA PREPROCEDURE EVALUATION
Anesthesia PreOp Note    HPI:     Praveen Hines is a 59 year old male who presents for preoperative consultation requested by: Reza Palafox MD    Date of Surgery: 2/19/2025    Procedure(s):  Retrograde urethrogram, cystogram, cystoscopy, optical internal urethrotomy, Mitomycin injection of the urethra, possible ureteroscopy and laser ureterotomy  CYSTOSCOPY TRANSURETHRAL INJECTION OF CONTIGEN  LASER HOLMIUM LITHOTRIPSY  CYSTOSCOPY URETEROSCOPY  Indication: Preop testing, incomplete bladder emptying, membranous urethral structure, BPH with  obstruction/lower urinary tract symptoms, lower urinary tract symptoms    Relevant Problems   No relevant active problems       NPO:  Last Liquid Consumption Date: 02/18/25  Last Liquid Consumption Time: 1900  Last Solid Consumption Date: 02/18/25  Last Solid Consumption Time: 1900  Last Liquid Consumption Date: 02/18/25          History Review:  Patient Active Problem List    Diagnosis Date Noted    Incomplete bladder emptying 05/14/2020    Retention, urine 01/10/2020    Bladder stones 01/10/2020    Bilateral hydronephrosis 01/10/2020    BPH with obstruction/lower urinary tract symptoms 01/10/2020    DDD (degenerative disc disease), lumbar     Spinal stenosis, cervical region     HTN (hypertension) 03/07/2013    DM (diabetes mellitus) (HCC) 10/20/2011    Positive PPD, treated (Rifampin Tx X 2) 09/29/2011    HLD (hyperlipidemia) 09/29/2011    AMIRA (obstructive sleep apnea) 09/29/2011    Psoriasis 09/29/2011       Past Medical History:    BPH (benign prostatic hyperplasia)    COVID    10/2020 NO SX    DDD (degenerative disc disease), lumbar    Diabetes (HCC)    DM (diabetes mellitus) ([facility])    Esophageal reflux    High blood pressure    High cholesterol    AMIRA (obstructive sleep apnea)    on CPAP since 2010    Other and unspecified hyperlipidemia    Psoriasis    Humira intermittently (no benefit from Otezla)    Sleep apnea    CPAP    Spinal stenosis, cervical  region    Tuberculosis    TX NO SX     Visual impairment    glasses       Past Surgical History:   Procedure Laterality Date    Cataracts, ophthm (dmg) Bilateral 2013    Other  12/28/2016    Anterior Approach C7/T1 Fusion (IBJ)    Other  2016    sinus surgery for chronic sinusitis    Other  01/16/2020    TURP dt Prostate Hyperplasia    Other surgical history Bilateral 2010    right shoulder X 1, left shoulder X 2    Other surgical history      varicocele    Other surgical history  2007    c4-5-6 fusion    Vasectomy         Prescriptions Prior to Admission[1]  Current Medications and Prescriptions Ordered in Epic[2]    Allergies[3]    Family History   Problem Relation Age of Onset    Other (MVA) Father 66    Cancer Mother 72        melanoma     Social History     Socioeconomic History    Marital status:      Spouse name: Brittany    Number of children: 2   Occupational History    Occupation: [employer]    Tobacco Use    Smoking status: Never    Smokeless tobacco: Never   Vaping Use    Vaping status: Never Used   Substance and Sexual Activity    Alcohol use: Not Currently     Comment: RARE    Drug use: No       Available pre-op labs reviewed.             Vital Signs:  Body mass index is 33.04 kg/m².   height is 1.765 m (5' 9.5\") and weight is 103 kg (227 lb). His oral temperature is 97.7 °F (36.5 °C). His blood pressure is 136/99 (abnormal) and his pulse is 54. His respiration is 18 and oxygen saturation is 95%.   Vitals:    02/14/25 0940 02/19/25 1134   BP:  (!) 136/99   Pulse:  54   Resp:  18   Temp:  97.7 °F (36.5 °C)   TempSrc:  Oral   SpO2:  95%   Weight: 104.3 kg (230 lb) 103 kg (227 lb)   Height: 1.778 m (5' 10\") 1.765 m (5' 9.5\")        Anesthesia Evaluation      Airway   Mallampati: III  TM distance: >3 FB  Neck ROM: full  Dental      Pulmonary - normal exam   (+) sleep apnea  Cardiovascular - normal exam  (+) hypertension    Neuro/Psych      GI/Hepatic/Renal    (+) GERD    Endo/Other    (+)  diabetes mellitus  Abdominal   (-) obese                 Anesthesia Plan:   ASA:  3  Plan:   General  Airway:  ETT  Informed Consent Plan and Risks Discussed With:  Patient  Consent Comment: I have discussed the anesthetic plan, major risks and alternatives with the patient and answered all questions.  Minor and major side effects were discussed with patient, including but not limited to: injury to teeth/gums/lips, aspiration, nausea/vomiting postoperatively, anaphylaxis, heart attack, stroke, post-operative ventilation and death. The patient desires to proceed with surgery and anesthesia as planned. All questions answered.        I have informed Praveen Hines and/or legal guardian or family member of the nature of the anesthetic plan, benefits, risks including possible dental damage if relevant, major complications, and any alternative forms of anesthetic management.   All of the patient's questions were answered to the best of my ability. The patient desires the anesthetic management as planned.  Fabian Wilde DO  2/19/2025 11:38 AM  Present on Admission:  **None**           [1]   Medications Prior to Admission   Medication Sig Dispense Refill Last Dose/Taking    carvedilol 12.5 MG Oral Tab Take 1 tablet (12.5 mg total) by mouth 2 (two) times daily with meals.   2/19/2025 at  6:30 AM    Dulaglutide (TRULICITY) 3 MG/0.5ML Subcutaneous Solution Auto-injector Inject 1 Dose into the skin once a week.   2/9/2025    metFORMIN HCl 1000 MG Oral Tab Take 1 tablet (1,000 mg total) by mouth 2 (two) times daily.   2/18/2025 Evening    Omeprazole 40 MG Oral Capsule Delayed Release Take 1 capsule (40 mg total) by mouth every morning before breakfast.   2/18/2025 Morning    Acitretin 10 MG Oral Cap Take 1 capsule (10 mg total) by mouth every morning before breakfast.   2/12/2025    ATORVASTATIN 40 MG Oral Tab Take 1 tablet (40 mg total) by mouth daily. 90 tablet 0 2/18/2025 Morning    PIOGLITAZONE 15 MG Oral Tab TAKE ONE  TABLET BY MOUTH ONE TIME DAILY (Patient taking differently: Take 2 tablets (30 mg total) by mouth at bedtime.) 90 tablet 0 2/18/2025 Evening    amLODIPine 10 MG Oral Tab Take 0.5 tablets (5 mg total) by mouth daily. 90 tablet 0 2/19/2025 at  6:30 AM   [2]   Current Facility-Administered Medications Ordered in Epic   Medication Dose Route Frequency Provider Last Rate Last Admin    lactated ringers infusion   Intravenous Continuous Reza Palaofx MD        acetaminophen (Tylenol Extra Strength) tab 1,000 mg  1,000 mg Oral Once Reza Palafox MD        metoprolol tartrate (Lopressor) tab 25 mg  25 mg Oral Once PRN Reza Palafox MD        famotidine (Pepcid) tab 20 mg  20 mg Oral Once Reza Palafox MD        Or    famotidine (Pepcid) 20 mg/2mL injection 20 mg  20 mg Intravenous Once Reza Palafox MD        metoclopramide (Reglan) tab 10 mg  10 mg Oral Once Reza Palafox MD        Or    metoclopramide (Reglan) 5 mg/mL injection 10 mg  10 mg Intravenous Once Reza Palafox MD        ceFAZolin (Ancef) 2g in 10mL IV syringe premix  2 g Intravenous Once Rzea Palafox MD         No current [facility]-ordered outpatient medications on file.   [3] No Known Allergies

## 2025-02-19 NOTE — H&P
Praveen Hines is a 59 year old male.    HO BPH and LUTS SP TURP 1-15-20  Bulbar Urethral stricture 11-21-23 - UD and then CIC 2 x a day     High Residual urine CIC Q day     Acupuncture treatments -- stronger stream     IDDM     HO UD as a child form enuresis     ED -- NO semen -- SP TURP     No sex meds     Subjective:     Chief Complaint: No chief complaint on file.    LUTS     Stricture     History/Other:     Current Outpatient Medications   Medication Sig Dispense Refill   doxycycline 100 MG Oral Cap Take 1 capsule (100 mg total) by mouth 2 (two) times daily for 7 days. 14 capsule 0   OMEPRAZOLE 40 MG Oral Capsule Delayed Release TAKE ONE CAPSULE BY MOUTH DAILY BEFORE A MEAL 90 capsule 0   Acitretin 25 MG Oral Cap Take 1 capsule by mouth in the morning before breakfast daily. 30 capsule 0   TRULICITY 3 MG/0.5ML Subcutaneous Solution Pen-injector INJECT 1 SYRINGE SUBCUTANEOUSLY ONCE A WEEK 12 mL 0   Olopatadine HCl 0.2 % Ophthalmic Solution Apply 1 drop to eye daily. 2.5 mL 0   pioglitazone 30 MG Oral Tab Take 1 tablet (30 mg total) by mouth daily. 100 tablet 1   metFORMIN HCl 1000 MG Oral Tab Take 1 tablet (1,000 mg total) by mouth 2 (two) times daily. 200 tablet 2   amLODIPine 10 MG Oral Tab Take 0.5 tablets (5 mg total) by mouth daily. 90 tablet 1   atorvastatin 40 MG Oral Tab Take 1 tablet (40 mg total) by mouth daily. 90 tablet 1   carvedilol 12.5 MG Oral Tab Take 1 tablet (12.5 mg total) by mouth 2 (two) times daily with meals. 180 tablet 1   Insulin Glargine-yfgn (SEMGLEE, YFGN,) 100 UNIT/ML Subcutaneous Solution Pen-injector Inject 10 Units into the skin at bedtime. 15 mL 0   Insulin Aspart Pen (NOVOLOG FLEXPEN) 100 UNIT/ML Subcutaneous Solution Pen-injector Inject 2-10 units into the skin 3 times daily before meals 15 mL 0   Acitretin 10 MG Oral Cap Take 1 capsule (10 mg total) by mouth every morning before breakfast. 30 capsule 0     Past Medical History:   Diagnosis Date   BPH (benign prostatic  hyperplasia)   COVID   10/2020 NO SX   DDD (degenerative disc disease), lumbar   Diabetes (HCC)   DM (diabetes mellitus) (HCC) 10/20/2011   High blood pressure   High cholesterol   AMIRA (obstructive sleep apnea)   on CPAP since 2010   Other and unspecified hyperlipidemia   Plantar fasciitis, right   Psoriasis 1998   Humira intermittently (no benefit from Otezla)   Sleep apnea   Spinal stenosis, cervical region   Tuberculosis   TX NO SX     Past Surgical History:   Procedure Laterality Date   CATARACTS, OPHTHM (DMG) Bilateral 2013   CYSTOURETHROSCOPY 07/02/2024   Dr. Palafox   OTHER 12/28/2016   Anterior Approach C7/T1 Fusion (IBJ)   OTHER 2016   sinus surgery for chronic sinusitis   OTHER 01/16/2020   TURP dt Prostate Hyperplasia   OTHER SURGICAL HISTORY Bilateral 2010   right shoulder X 1, left shoulder X 2   OTHER SURGICAL HISTORY   varicocele   OTHER SURGICAL HISTORY 2007   c4-5-6 fusion   OTHER SURGICAL HISTORY 10/29/2024   Cystoscopy - Dr. Palafox   VASECTOMY     Social History  Tobacco Use  Smoking status: Never  Smokeless tobacco: Never  Alcohol use: Yes  Comment: RARE  Drug use: No      REVIEW OF SYSTEMS:     GENERAL HEALTH: feels well otherwise  SKIN: denies any unusual skin lesions or rashes  RESPIRATORY: denies shortness of breath with exertion  CARDIOVASCULAR: denies chest pain on exertion  GI: denies abdominal pain and denies heartburn  : See HPI  NEURO: denies headaches    Objective:     GENERAL: well developed, well nourished, in no apparent distress  HEENT: atraumatic, normocephalic, ears and throat are clear  NECK: supple  LUNGS: normal respiratory motion without distress  CARDIO:NA  GI: NA  :NL Penis: Testicles: No masses or tenderness. Prostate: Is soft, smooth, no nodules.   MUSCULOSKELETAL: FROM, Normal gait  NEURO: Alert   EXTREMITIES: No edema or cyanosis          Procedure : Cystoscopy LAST OV     Membranous urethra was 14 FR -- could not look more proximal   When the procedure was  completed, the cystoscope was removed. The patient tolerated the procedure well.     Assessment & Plan:     Bulbar Urethral Stricture - recurrent   BPH and LUTS -- SP TURP   LEFT Moderate Hydro -- from UVJ stricture   HO Bilat hydro  -- from UVJ stricture       5. Incomplete Bladder Emptying --      6-6-24 SP DVIU and Optilume   SP Laser ureterotomy of LEFT UVJ stricture     Was good stream till Sept 11-12-24 LUTS fair stream, occ split, occ urgency . Night x 1 , Much Stronger stream   10-29-24 RBUS NOW -- R hydro moderate and L moderate Hydro and large    Last BMP - Cr 0.96   11-11-24 Cr 1.04   PLAN   Urethroplasty VS   DVIU and Mitomycin and CIC BID and Cystogram to confirm reflux   Informed Consent Endo Case       All risks, benefits and alternatives to surgery were discussed with the patient in detail. Typical risks of gross hematuria, UTI, urosepsis, need for hospitalization, surgical failure ( and thus the need for further surgery) were discussed with patient in detail. In addition, further very rare inherent risks specific to lithotomy positioning, such as neuropathy, DVT, PE and rhabdomyolysis were discussed. Complications such as urethral, ureteral, or bladder injury and thus subsequent/ delayed stricture or fistula formation were discussed as a rare , but inherent risk of endoscopic surgery. Differing possible surgical options and approaches were also discussed. All patient questions were answered. After weighing all the risks, benefits and alternatives to surgery, the patient wishes to proceed with surgery as discussed. Will schedule accordingly.     5. OAB and BPH / LUTS     HO BPH and LUTS SP TURP 1-15-20  SP accupuncture is working -- maybe URDO and then SNS after we open the stenosis    Patient presents for a telemedicine visit today to discuss their urologic conditions. Before beginning the visit, we obtained verbal informed consent, and confirmed the patient's identity and  current location. The telemedicine consultation was conducted in accordance with confidentiality and privacy standards, as well as with applicable medical standards and guidelines.The secure interactive audio-visual session was conducted via a secure, face to face video/ audio conferencing platform. Patient is aware that Dr. Palafox is in the office (in a private location), with full access to the patient's medical information via the [employer] EMR. Patient is physically in the [facility] and is aware that they will be billed via insurance for this telemedicine visit. Total time of the telemedicine visit was determined by the time spent assessing the patient, reviewing outside and internal records, personal review of imaging (internal and outside) and discussion time with the patient. 11m    Referring MD -- Mayito

## 2025-02-19 NOTE — ANESTHESIA PROCEDURE NOTES
Airway  Date/Time: 2/19/2025 1:00 PM  Urgency: Elective      General Information and Staff    Patient location during procedure: OR  Anesthesiologist: Fabian Wilde DO  Performed: anesthesiologist   Performed by: Fabian Wilde DO  Authorized by: Fabian Wilde DO      Indications and Patient Condition  Indications for airway management: anesthesia  Sedation level: deep  Preoxygenated: yes  Patient position: sniffing  Mask difficulty assessment: 1 - vent by mask    Final Airway Details  Final airway type: endotracheal airway      Successful airway: ETT  Cuffed: yes   Successful intubation technique: direct laryngoscopy  Endotracheal tube insertion site: oral  Blade: [place]  Blade size: #4  ETT size (mm): 7.5    Cormack-Lehane Classification: grade I - full view of glottis  Placement verified by: capnometry   Measured from: lips  ETT to lips (cm): 22  Number of attempts at approach: 1

## 2025-02-19 NOTE — OPERATIVE REPORT
[facility]  part of [facility]    Operative Note         Praveen Hines Location: OR   [facility] [id number] MRN [id number]   Admission Date 2/19/2025 Operation Date 2/19/2025   Attending Physician Reza Palafox*       Patient Name: Praveen Hines     Preoperative Diagnosis: Preop testing, incomplete bladder emptying, membranous urethral structure, BPH with  obstruction/lower urinary tract symptoms, lower urinary tract symptoms   Bilateral Hydronephrosis     Postoperative Diagnosis: Preop testing, incomplete bladder emptying, membranous urethral structure, BPH with obstruction/lower urinary tract symptoms, lower urinary tract symptoms   Bilateral Hydronephrosis, Left Ureteral reflux, Right Ureteral stricture     Procedure(s):  Retrograde urethrogram, cystogram, cystoscopy, optical internal urethrotomy, Mitomycin injection of the membranous urethra, ureteroscopy and laser ureterotomy of  distal RIGHT ureteral stricture, right ureteral stent placement,        Primary Surgeon: Reza Palafox MD           Anesthesia: General     Specimen: * No specimens in log *     Estimated Blood Loss: Blood Output: 10 mL (2/19/2025  2:20 PM)    Complications: none      Indications for procedure: R  ureteral stricture, bilateral   hydro,  membranous  Stricture     Surgical Findings:   short  1 cm  distal ureteral stricture,  RT  changes of bladder and rigid bladder neck      Complexity:  (optional)    Operative Summary:    The patient was was identified in the operating table.  He was draped and prepped in usual sterile fashion lithotomy position he underwent a standard timeout procedure and received IV Ancef and Venodyne's within our surgery.  Initially we performed a retrograde urethrogram which noted a 1 cm stricture at the level of the membranous urethra.  We then under visual guidance placed a sensor guidewire into the bladder and then we dilated the urethra with Sharma sounds to 24  [place] we then looked in with an optical urethrotome and incised the membranous urethra at the 612 9 and 3 o'clock position with cold knife.  We then injected in multiple sites with where the urethrotomy's were done with 1.5 mg total of mitomycin at 0.3 mg x 5 cc.  Distal bladder.  So there was a right ureteral stricture that never drained so we decided that we would place in a 10 dilator up the ureter over the guidewire and then we used a 365 laser fiber to laser the 12 o'clock position back about 2 cm.  Once we did this we could see a nice pink mucosa of the ureter.  We then looked up with ureteroscope and noted that proximal ureter to the stricture was nicely open and there was no residual stricture.  We then placed post stiff Glidewire and over this we placed a 6 x 26 double-J stent with  good curls noted in the kidney and bladder.  As noted visually as well on cystoscopy as well as with fluoroscopy. .  We then placed a 16 [place] Smith catheter and  the patient was subsequently transferred to the recovery room in stable condition.        Implants:   Implant Name Type Inv. Item Serial No.  Lot No. LRB No. Used Action   STENT URET 6FR L26CM PERCFLX HYDR+ PGTL TAPR - SN/A  STENT URET 6FR L26CM PERCFLX HYDR+ PGTL TAPR N/A [employer] WD [id number] Right 1 Implanted        Drains: 16  Fr Smith   6  x 26 JJ      Condition: stable        Reza Palafox MD

## (undated) DEVICE — CATHETER BARDEX LBRCTH 16FR 5CC FOLEY 2 WAY 2 OPPOSE DRN EYE

## (undated) DEVICE — SOLUTION IRR 1000ML 0.9% NACL PLASTIC POUR BTL ISTNC N-PYRG

## (undated) DEVICE — TRAY CATH FOLEY 16FR INCLUDE BARDX IC COMPLT CARE

## (undated) DEVICE — WILLIAMS CYSTOSCOPIC INJECTION NEEDLE: Brand: WILLIAMS

## (undated) DEVICE — URETERAL STENT
Type: IMPLANTABLE DEVICE | Site: URETER | Status: NON-FUNCTIONAL
Brand: CONTOUR™
Removed: 2024-06-06

## (undated) DEVICE — CATHETER URET 5FR 70CM STD OP-EN LF FLXTP ACCEPTS .038IN GW

## (undated) DEVICE — GLOVE SUR 8 SENSICARE PI PIP GRN PWD F

## (undated) DEVICE — TUR Y CYSTO TUBING SET IRRIG L96IN

## (undated) DEVICE — ANTIBACTERIAL UNDYED BRAIDED (POLYGLACTIN 910), SYNTHETIC ABSORBABLE SUTURE: Brand: COATED VICRYL

## (undated) DEVICE — GLOVE SURG 6.5 PROTEXIS LF BLUE PF SMTH BEAD CUFF INTLK STRL

## (undated) DEVICE — CATHETER URET 5FR L70CM FLX OPN TIP NONPORTED

## (undated) DEVICE — LASER SURG HI PWR HOLM

## (undated) DEVICE — GAMMEX® PI HYBRID SIZE 8, STERILE POWDER-FREE SURGICAL GLOVE, POLYISOPRENE AND NEOPRENE BLEND: Brand: GAMMEX

## (undated) DEVICE — CATHETER LUBRI-SIL 14FR 5CC FOLEY 2 WAY BLN SIL HYDROGEL

## (undated) DEVICE — ELECTRODE ESURG 45D NDL 24-28FR HFRQ RESCT STRL DISP RSSCP

## (undated) DEVICE — SOLUTION IRR 3000ML 0.9% NACL ARTHMTC LF

## (undated) DEVICE — SYRINGE 5ML GRAD N-PYRG DEHP-FR PVC FREE STRL MED LF DISP LL

## (undated) DEVICE — NITINOL WIRE WITH HYDROPHILIC TIP: Brand: SENSOR

## (undated) DEVICE — PAD,EYE,LARGE,2 1/8"X2 5/8",STERILE,LF: Brand: MEDLINE

## (undated) DEVICE — SLIM BODY SKIN STAPLER: Brand: APPOSE ULC

## (undated) DEVICE — GUIDE .035X150 STR/STF ZIPWIRE

## (undated) DEVICE — YANKAUER,FLEXIBLE HANDLE,REGLR CAPACITY: Brand: MEDLINE INDUSTRIES, INC.

## (undated) DEVICE — CATHETER BLN 5CM 24FR OPTILUME PACLITAXEL

## (undated) DEVICE — ELECTRODE PT RTN C30- LB 9FT CORD NONIRRITATE NONSENSITIZE

## (undated) DEVICE — GLOVE SURG 6.5 PROTEXIS LF CRM PF BEAD CUFF STRL PLISPRN

## (undated) DEVICE — SUT COAT VCRL 4-0 27IN RB-1 ABSRB UD 17MM 1/2

## (undated) DEVICE — PACK CUSTOM CYSTO

## (undated) DEVICE — SUT MCRYL 4-0 18IN PS-2 ABSRB UD 19MM 3/8 CIR

## (undated) DEVICE — SNAP KOVER: Brand: UNBRANDED

## (undated) DEVICE — GUIDEWIRE SNSR STRGT .035IN 150CM URO FLX TP RADOPQ NTNL SS

## (undated) DEVICE — Device

## (undated) DEVICE — SPONGE GAUZE 4X4IN CTN 12 PLY STRL CURITY

## (undated) DEVICE — SOLUTION IV 1000ML 0.9% NACL PRESERVATIVE

## (undated) DEVICE — SOLUTION IRRIG 1000ML 0.9% NACL USP BTL

## (undated) DEVICE — GOWN SURG XL L3 NONREINFORCE SET IN SLV STRL LF DISP BLUE

## (undated) DEVICE — GAMMEX® PI HYBRID SIZE 7.5, STERILE POWDER-FREE SURGICAL GLOVE, POLYISOPRENE AND NEOPRENE BLEND: Brand: GAMMEX

## (undated) DEVICE — WATER STRL 500ML PLASTIC POUR BTL LF

## (undated) DEVICE — GLOVE SURG 7 PROTEXIS LF CRM PF BEAD CUFF STRL PLISPRN 12IN

## (undated) DEVICE — WATER STRL 3000ML PLASTIC CNTNR URMTC LF

## (undated) DEVICE — SOAP BETASEPT 32 OZ 946 ML

## (undated) DEVICE — GOWN SURG LG L3 NONREINFORCE SET IN SLV STRL LF DISP BLUE

## (undated) DEVICE — DEVICE INFL ATRION 40CC 15 ATM

## (undated) DEVICE — GLOVE SUR 7.5 SENSICARE PI PIP CRM PWD F

## (undated) DEVICE — QUANTA OF 365UM  FIBER

## (undated) DEVICE — SYRINGE 50ML GRAD N-PYRG DEHP-FR PVC FREE STRL CATHTP LF

## (undated) DEVICE — DRAPE CLIP GEOEC 9600 9800 84X16IN C ARM

## (undated) DEVICE — TOWEL,OR,DSP,ST,BLUE,DLX,2/PK,40PK/CS: Brand: MEDLINE

## (undated) DEVICE — DILATOR/SHEATH SET: Brand: 8/10 DILATOR/SHEATH SET

## (undated) DEVICE — DRAPE,UNDRBUT,WHT GRAD PCH,CAPPORT,20/CS: Brand: MEDLINE

## (undated) DEVICE — DRAPE,LITHOTOMY,STERILE: Brand: MEDLINE

## (undated) DEVICE — TRAY CATH FOLEY 350CC 16FR CATH BACTIGUARD SIL

## (undated) DEVICE — SYRINGE,TOOMEY,IRRIGATION,70CC,STERILE: Brand: MEDLINE

## (undated) DEVICE — SUT COAT VCRL 4-0 27IN RB-1 ABSRB VLT 17MM 1/

## (undated) DEVICE — SLEEVE COMPR MD KNEE LEN SGL USE KENDALL SCD